# Patient Record
Sex: MALE | Race: OTHER | HISPANIC OR LATINO | Employment: UNEMPLOYED | URBAN - METROPOLITAN AREA
[De-identification: names, ages, dates, MRNs, and addresses within clinical notes are randomized per-mention and may not be internally consistent; named-entity substitution may affect disease eponyms.]

---

## 2023-12-28 PROBLEM — C49.21: Status: ACTIVE | Noted: 2023-12-28

## 2024-01-12 PROBLEM — A41.9 SEPSIS WITHOUT ACUTE ORGAN DYSFUNCTION: Status: ACTIVE | Noted: 2024-01-12

## 2024-01-12 PROBLEM — A41.9 SEPSIS WITHOUT ACUTE ORGAN DYSFUNCTION: Status: RESOLVED | Noted: 2024-01-12 | Resolved: 2024-01-12

## 2024-01-12 PROBLEM — D64.9 ANEMIA: Status: ACTIVE | Noted: 2024-01-12

## 2024-01-12 PROBLEM — R03.0 ELEVATED BP WITHOUT DIAGNOSIS OF HYPERTENSION: Status: ACTIVE | Noted: 2024-01-12

## 2024-01-12 PROBLEM — E66.9 OBESITY, CLASS I, BMI 30-34.9: Status: ACTIVE | Noted: 2024-01-12

## 2024-01-12 PROBLEM — R65.20 SEVERE SEPSIS: Status: ACTIVE | Noted: 2024-01-12

## 2024-01-12 PROBLEM — A41.9 SEVERE SEPSIS: Status: ACTIVE | Noted: 2024-01-12

## 2024-01-12 PROBLEM — E11.9 TYPE 2 DIABETES MELLITUS WITHOUT COMPLICATION, WITHOUT LONG-TERM CURRENT USE OF INSULIN: Status: ACTIVE | Noted: 2024-01-12

## 2024-01-12 PROBLEM — E66.811 OBESITY, CLASS I, BMI 30-34.9: Status: ACTIVE | Noted: 2024-01-12

## 2024-01-13 PROBLEM — I82.4Y1 ACUTE DEEP VEIN THROMBOSIS (DVT) OF PROXIMAL VEIN OF RIGHT LOWER EXTREMITY: Status: ACTIVE | Noted: 2024-01-13

## 2024-01-14 PROBLEM — M79.89 LEG SWELLING: Status: ACTIVE | Noted: 2024-01-14

## 2024-01-17 PROBLEM — R07.9 CHEST PAIN: Status: ACTIVE | Noted: 2024-01-17

## 2024-01-29 PROBLEM — I82.5Y1 CHRONIC DEEP VEIN THROMBOSIS (DVT) OF PROXIMAL VEIN OF RIGHT LOWER EXTREMITY: Chronic | Status: ACTIVE | Noted: 2024-01-13

## 2024-01-29 PROBLEM — C83.35 DIFFUSE LARGE B-CELL LYMPHOMA OF LYMPH NODES OF INGUINAL REGION: Status: ACTIVE | Noted: 2024-01-29

## 2024-01-29 PROBLEM — C49.21: Chronic | Status: ACTIVE | Noted: 2023-12-28

## 2024-04-05 PROBLEM — M79.661 RIGHT CALF PAIN: Status: ACTIVE | Noted: 2024-04-05

## 2024-04-05 PROBLEM — I82.5Z1 CHRONIC DEEP VEIN THROMBOSIS (DVT) OF DISTAL VEIN OF RIGHT LOWER EXTREMITY: Status: ACTIVE | Noted: 2024-04-05

## 2024-04-09 PROBLEM — M79.661 RIGHT CALF PAIN: Status: RESOLVED | Noted: 2024-04-05 | Resolved: 2024-04-09

## 2024-04-26 ENCOUNTER — SOCIAL WORK (OUTPATIENT)
Dept: ADMINISTRATIVE | Facility: OTHER | Age: 39
End: 2024-04-26

## 2024-04-26 NOTE — PROGRESS NOTES
Was asked by MD to assist pt with obtaining Eliquis medication. Met with pt and pt sister(Eneida Davis) and discussed with pt about being assisted with Eliquis medication through the [employer] Patient Assistance program. Pt agreed to be assisted and pt signature was obtained on the enrollment application. Informed pt that application will be submitted to [employer] upon obtaining MD signature. No further needs were noted by pt at that time. Will continue to follow pt and assist pt in obtaining medication.       Kaye Mukherjee LMSW    Ext [phone]/Pager [phone]

## 2024-04-29 ENCOUNTER — SOCIAL WORK (OUTPATIENT)
Dept: ADMINISTRATIVE | Facility: OTHER | Age: 39
End: 2024-04-29

## 2024-04-29 NOTE — PROGRESS NOTES
Obtained MD signature on the [employer] Prescriber form. Faxed completed enrollment application to [phone] for assistance with Eliquis medication for pt. Will continue to follow pt and assist pt in obtaining assistance with medication.       Kaye Mukherjee LMSW    Ext [phone]/Pager [phone]

## 2024-05-01 ENCOUNTER — SOCIAL WORK (OUTPATIENT)
Dept: ADMINISTRATIVE | Facility: OTHER | Age: 39
End: 2024-05-01

## 2024-05-01 NOTE — PROGRESS NOTES
Contacted [employer]@[phone], spoke with Judith to follow up on receiving enrollment application for assistance with Eliquis medication and she confirmed receipt of application and states she will send application off for processing. Will continue to follow up and assist pt with obtaining assistance with medication.       Kaye Mukherjee LMSW    Ext [phone]/Pager [phone]

## 2024-05-07 ENCOUNTER — SOCIAL WORK (OUTPATIENT)
Dept: ADMINISTRATIVE | Facility: OTHER | Age: 39
End: 2024-05-07

## 2024-05-07 NOTE — PROGRESS NOTES
Contacted [employer]@[phone], spoke with Komal to follow up on the status of pt enrollment application for assistance with Eliquis medication and was told pt was approved on 5/2/24 until 5/1/25. Komal states the pharmacist have tried calling pt to schedule medication but pt was not reachable. Komal provided Sw with the contact name and number for [facility] pharmacy([phone]) for pt to call and schedule medication with the pharmacy. Contacted [phone] to notify him of his approval for assistance but pt was not reachable. Contacted pt alternate contact-(Eneida [phone]) and informed her of pt approval and she reported that pt is right here and Sw can speak with him. Informed pt of his approval and pt reported that he received a call from the pharmacy and he was told that he will receive his medication in 2-3 days. Informed pt that Sw will follow up with him in regards to receipt of medication. Pt verbalized understanding. No further needs were noted at that time. Will continue follow pt and assist as needed.       Kaye Mukherjee LMSW    Ext [phone]/Pager [phone]

## 2024-05-12 PROBLEM — E11.65 TYPE 2 DIABETES MELLITUS WITH HYPERGLYCEMIA, WITHOUT LONG-TERM CURRENT USE OF INSULIN: Status: ACTIVE | Noted: 2024-01-12

## 2024-05-12 PROBLEM — T79.7XXA SUBCUTANEOUS EMPHYSEMA: Status: ACTIVE | Noted: 2024-05-12

## 2024-05-12 PROBLEM — R79.82 CRP ELEVATED: Status: ACTIVE | Noted: 2024-05-12

## 2024-05-12 PROBLEM — D72.829 LEUKOCYTOSIS: Status: ACTIVE | Noted: 2024-05-12

## 2024-05-12 PROBLEM — E87.1 HYPONATREMIA: Status: ACTIVE | Noted: 2024-05-12

## 2024-05-12 PROBLEM — G47.00 INSOMNIA: Status: ACTIVE | Noted: 2024-05-12

## 2024-05-12 PROBLEM — A41.9 SEPSIS: Status: ACTIVE | Noted: 2024-05-12

## 2024-05-12 PROBLEM — M72.6 NECROTIZING FASCIITIS: Status: ACTIVE | Noted: 2024-05-12

## 2024-05-12 PROBLEM — L02.415 ABSCESS OF RIGHT LOWER EXTREMITY: Status: ACTIVE | Noted: 2024-05-12

## 2024-05-15 PROBLEM — R00.0 SINUS TACHYCARDIA: Status: ACTIVE | Noted: 2024-05-15

## 2024-05-16 PROBLEM — M86.9 OSTEOMYELITIS: Status: ACTIVE | Noted: 2024-05-16

## 2024-05-16 PROBLEM — D68.9 COAGULOPATHY: Status: ACTIVE | Noted: 2024-05-16

## 2024-05-16 PROBLEM — M46.28 ACUTE OSTEOMYELITIS OF COCCYX: Status: ACTIVE | Noted: 2024-05-16

## 2024-05-19 PROBLEM — E43 SEVERE PROTEIN-CALORIE MALNUTRITION: Status: ACTIVE | Noted: 2024-05-19

## 2024-05-31 PROBLEM — N30.00 ACUTE CYSTITIS WITHOUT HEMATURIA: Status: ACTIVE | Noted: 2024-05-31

## 2024-06-04 PROBLEM — D72.829 LEUKOCYTOSIS: Status: RESOLVED | Noted: 2024-05-12 | Resolved: 2024-06-04

## 2024-06-04 PROBLEM — R00.0 SINUS TACHYCARDIA: Status: RESOLVED | Noted: 2024-05-15 | Resolved: 2024-06-04

## 2024-06-04 PROBLEM — E87.1 HYPONATREMIA: Status: RESOLVED | Noted: 2024-05-12 | Resolved: 2024-06-04

## 2024-08-12 PROBLEM — A41.9 SEPSIS: Status: RESOLVED | Noted: 2024-05-12 | Resolved: 2024-08-12

## 2024-10-15 ENCOUNTER — PATIENT OUTREACH (OUTPATIENT)
Dept: ADMINISTRATIVE | Facility: HOSPITAL | Age: 39
End: 2024-10-15

## 2024-10-15 DIAGNOSIS — E11.65 TYPE 2 DIABETES MELLITUS WITH HYPERGLYCEMIA, WITHOUT LONG-TERM CURRENT USE OF INSULIN: Primary | ICD-10-CM

## 2024-10-15 PROBLEM — E11.9 TYPE 2 DIABETES MELLITUS WITHOUT COMPLICATION, WITHOUT LONG-TERM CURRENT USE OF INSULIN: Status: ACTIVE | Noted: 2024-10-15

## 2024-10-31 ENCOUNTER — PATIENT OUTREACH (OUTPATIENT)
Dept: ADMINISTRATIVE | Facility: HOSPITAL | Age: 39
End: 2024-10-31